# Patient Record
Sex: MALE | Race: WHITE | NOT HISPANIC OR LATINO | Employment: OTHER | URBAN - METROPOLITAN AREA
[De-identification: names, ages, dates, MRNs, and addresses within clinical notes are randomized per-mention and may not be internally consistent; named-entity substitution may affect disease eponyms.]

---

## 2019-08-20 ENCOUNTER — HOSPITAL ENCOUNTER (EMERGENCY)
Facility: HOSPITAL | Age: 74
Discharge: HOME | End: 2019-08-20
Attending: EMERGENCY MEDICINE
Payer: MEDICARE

## 2019-08-20 VITALS
TEMPERATURE: 98 F | BODY MASS INDEX: 26.77 KG/M2 | DIASTOLIC BLOOD PRESSURE: 80 MMHG | RESPIRATION RATE: 18 BRPM | HEART RATE: 70 BPM | OXYGEN SATURATION: 99 % | HEIGHT: 73 IN | SYSTOLIC BLOOD PRESSURE: 167 MMHG | WEIGHT: 202 LBS
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
## 2019-08-20 DIAGNOSIS — S80.812A ABRASION, LOWER LEG, ANTERIOR, LEFT, INITIAL ENCOUNTER: ICD-10-CM

## 2019-08-20 DIAGNOSIS — S80.12XA HEMATOMA OF LEG, LEFT, INITIAL ENCOUNTER: ICD-10-CM

## 2019-08-20 DIAGNOSIS — T14.8XXA CONTUSION OF BONE: ICD-10-CM
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
## 2019-08-20 PROCEDURE — 99283 EMERGENCY DEPT VISIT LOW MDM: CPT

## 2019-08-20 RX ORDER — MUPIROCIN 20 MG/G
OINTMENT TOPICAL 3 TIMES DAILY
Qty: 30 G | Refills: 0 | Status: SHIPPED | OUTPATIENT
Start: 2019-08-20

## 2019-08-20 RX ORDER — SULFAMETHOXAZOLE AND TRIMETHOPRIM 800; 160 MG/1; MG/1
1 TABLET ORAL 2 TIMES DAILY
Qty: 20 TABLET | Refills: 0 | Status: SHIPPED | OUTPATIENT
Start: 2019-08-20 | End: 2019-08-30

## 2019-08-21 NOTE — ED PROVIDER NOTES
Encounter Date: 8/20/2019       History     Chief Complaint   Patient presents with    Leg Pain     hematoma to left lower leg from zero turn lawn mower     74-year-old male, presents emergency department for evaluation of complaints of an injury to his left lower leg.  Approximately 1-2 weeks ago the patient states that he sustained blunt trauma to the left lower leg.  While teaching his significant other how to operate a 0 turned on more, the  made an abrupt movement which caused the machine to strike the patient in the anterior aspect of the left lower extremity.  The patient sustained a soft tissue injury, an abrasion and subsequent hematoma development in the lower leg.  Patient denies any real significant improvement or change in his wound or symptoms since onset and presents to the emergency department for evaluation.  He denies fever, chills, sweats, drainage from the area.  He denies difficulty ambulating, bony pain or tenderness.        Review of patient's allergies indicates:  No Known Allergies  History reviewed. No pertinent past medical history.  History reviewed. No pertinent surgical history.  History reviewed. No pertinent family history.  Social History     Tobacco Use    Smoking status: Not on file   Substance Use Topics    Alcohol use: Not on file    Drug use: Not on file     Review of Systems   Constitutional: Negative for fever.   HENT: Negative for sore throat.    Eyes: Negative for redness.   Respiratory: Negative for shortness of breath.    Cardiovascular: Negative for chest pain.   Gastrointestinal: Negative for nausea.   Genitourinary: Negative for dysuria.   Musculoskeletal: Negative for back pain.   Skin: Positive for wound. Negative for rash.   Neurological: Negative for weakness.   Hematological: Does not bruise/bleed easily.   Psychiatric/Behavioral: Negative for behavioral problems. The patient is not nervous/anxious.    All other systems reviewed and are  negative.      Physical Exam     Initial Vitals [08/20/19 1857]   BP Pulse Resp Temp SpO2   (!) 167/80 70 18 98.1 °F (36.7 °C) 99 %      MAP       --         Physical Exam    Constitutional: He appears well-developed and well-nourished.   HENT:   Head: Normocephalic and atraumatic.   Eyes: Conjunctivae, EOM and lids are normal.   Neck: Normal range of motion and full passive range of motion without pain.   Musculoskeletal: Normal range of motion.        Left lower leg: He exhibits tenderness and bony tenderness. He exhibits no swelling, no edema, no deformity and no laceration.        Legs:  Neurological: He is alert.   Skin: Skin is warm, dry and intact.   Psychiatric: He has a normal mood and affect. His speech is normal and behavior is normal.         ED Course   Procedures  Labs Reviewed - No data to display       Imaging Results    None          Medical Decision Making:   History:   I obtained history from: someone other than patient.  Initial Assessment:   NAD  Differential Diagnosis:   The patient's differential diagnoses includes but is not limited to musculoskeletal pain, contusion, abrasion, hematoma, abscess/cellulitis  Other:   I have discussed this case with another health care provider.       <> Summary of the Discussion: The patient's emergency department presentation, clinical course, pertinent findings of the physical exam as well as workup were discussed with the attending physician.  Plan of care was reviewed.              Attending Attestation:     Physician Attestation Statement for NP/PA:   I discussed this assessment and plan of this patient with the NP/PA, but I did not personally examine the patient. The face to face encounter was performed by the NP/PA.    Other NP/PA Attestation Additions:    History of Present Illness: I was not called upon to see this patient but was available for consultation and agree with the patient's disposition and management as it was presented to me by the APC.                         Clinical Impression:       ICD-10-CM ICD-9-CM   1. Contact with powered lawnmower as cause of accidental injury [place] farm as place of occurrence, initial encounter W28.XXXA E920.0    Y92.79 E849.1   2. Contusion of bone T14.8XXA 924.9   3. Abrasion, lower leg, anterior, left, initial encounter S80.812A 916.0   4. Hematoma of leg, left, initial encounter S80.12XA 924.5                                TAYLOR Tirado  08/20/19 1920       Morris Patterson MD  08/20/19 2116

## 2022-11-03 ENCOUNTER — HOSPITAL ENCOUNTER (EMERGENCY)
Facility: HOSPITAL | Age: 77
Discharge: HOME | End: 2022-11-03
Attending: EMERGENCY MEDICINE
Payer: MEDICARE

## 2022-11-03 VITALS
OXYGEN SATURATION: 93 % | RESPIRATION RATE: 17 BRPM | TEMPERATURE: 98 F | DIASTOLIC BLOOD PRESSURE: 86 MMHG | HEART RATE: 61 BPM | HEIGHT: 73 IN | SYSTOLIC BLOOD PRESSURE: 155 MMHG | WEIGHT: 200 LBS | BODY MASS INDEX: 26.51 KG/M2

## 2022-11-03 DIAGNOSIS — M43.6 TORTICOLLIS: ICD-10-CM

## 2022-11-03 DIAGNOSIS — M62.838 NECK MUSCLE SPASM: Primary | ICD-10-CM

## 2022-11-03 PROCEDURE — 63600175 PHARM REV CODE 636 W HCPCS: Performed by: EMERGENCY MEDICINE

## 2022-11-03 PROCEDURE — 25000003 PHARM REV CODE 250: Performed by: EMERGENCY MEDICINE

## 2022-11-03 PROCEDURE — 99284 EMERGENCY DEPT VISIT MOD MDM: CPT | Mod: 25

## 2022-11-03 PROCEDURE — 96372 THER/PROPH/DIAG INJ SC/IM: CPT | Performed by: EMERGENCY MEDICINE

## 2022-11-03 RX ORDER — NAPROXEN 500 MG/1
500 TABLET ORAL 2 TIMES DAILY WITH MEALS
Qty: 30 TABLET | Refills: 0 | Status: SHIPPED | OUTPATIENT
Start: 2022-11-03

## 2022-11-03 RX ORDER — METHOCARBAMOL 500 MG/1
1000 TABLET, FILM COATED ORAL
Status: COMPLETED | OUTPATIENT
Start: 2022-11-03 | End: 2022-11-03

## 2022-11-03 RX ORDER — DEXAMETHASONE SODIUM PHOSPHATE 4 MG/ML
8 INJECTION, SOLUTION INTRA-ARTICULAR; INTRALESIONAL; INTRAMUSCULAR; INTRAVENOUS; SOFT TISSUE
Status: COMPLETED | OUTPATIENT
Start: 2022-11-03 | End: 2022-11-03

## 2022-11-03 RX ORDER — OXYCODONE AND ACETAMINOPHEN 5; 325 MG/1; MG/1
2 TABLET ORAL
Status: COMPLETED | OUTPATIENT
Start: 2022-11-03 | End: 2022-11-03

## 2022-11-03 RX ORDER — METHOCARBAMOL 500 MG/1
1000 TABLET, FILM COATED ORAL 3 TIMES DAILY
Qty: 30 TABLET | Refills: 0 | Status: SHIPPED | OUTPATIENT
Start: 2022-11-03 | End: 2022-11-08

## 2022-11-03 RX ADMIN — DEXAMETHASONE SODIUM PHOSPHATE 8 MG: 4 INJECTION, SOLUTION INTRA-ARTICULAR; INTRALESIONAL; INTRAMUSCULAR; INTRAVENOUS; SOFT TISSUE at 10:11

## 2022-11-03 RX ADMIN — METHOCARBAMOL TABLETS 1000 MG: 500 TABLET, COATED ORAL at 10:11

## 2022-11-03 RX ADMIN — OXYCODONE AND ACETAMINOPHEN 2 TABLET: 325; 5 TABLET ORAL at 07:11

## 2022-11-03 NOTE — ED PROVIDER NOTES
Encounter Date: 11/3/2022       History     Chief Complaint   Patient presents with    Neck Pain     X3 days     77-year-old male presented emergency department with 3 days of neck pain.  Patient has bilateral neck pain in the paraspinal area which is worse when he tries to look to the sites.  Denies any trauma.  Patient said he woke up with this pain 3 days ago and still persistent.  Denies fever chills or nausea vomiting.  Patient said he has a right hand trigger finger surgery scheduled for tomorrow.  Patient said he wanted to get some relief for the neck pain.  Denies any weakness or numbness or fever or chills and denies any trauma    Review of patient's allergies indicates:  No Known Allergies  No past medical history on file.  No past surgical history on file.  No family history on file.     Review of Systems   Constitutional: Negative.    HENT: Negative.     Eyes: Negative.    Respiratory: Negative.     Cardiovascular: Negative.    Gastrointestinal: Negative.    Endocrine: Negative.    Genitourinary: Negative.    Musculoskeletal:  Positive for neck pain.   Skin: Negative.    Allergic/Immunologic: Negative.    Neurological: Negative.    Hematological: Negative.    Psychiatric/Behavioral: Negative.     All other systems reviewed and are negative.    Physical Exam     Initial Vitals [11/03/22 0655]   BP Pulse Resp Temp SpO2   (!) 147/83 61 18 98.6 °F (37 °C) (!) 94 %      MAP       --         Physical Exam    Nursing note and vitals reviewed.  Constitutional: He appears well-developed and well-nourished.   HENT:   Head: Normocephalic and atraumatic.   Nose: Nose normal.   Eyes: Conjunctivae and EOM are normal.   Neck: No tracheal deviation present.   Cardiovascular:  Normal rate, regular rhythm, normal heart sounds and intact distal pulses.     Exam reveals no friction rub.       No murmur heard.  Pulmonary/Chest: Breath sounds normal. No respiratory distress. He has no wheezes. He has no rales.   Abdominal:  Abdomen is soft. He exhibits no distension. There is no abdominal tenderness.   Musculoskeletal:         General: Tenderness present. Normal range of motion.      Comments: Cervical paraspinal tenderness more on the left than the right.  No midline C-spine tenderness     Lymphadenopathy:     He has no cervical adenopathy.   Neurological: He is alert and oriented to person, place, and time. He has normal strength.   Skin: Skin is warm and dry. Capillary refill takes less than 2 seconds.   Psychiatric: He has a normal mood and affect. Thought content normal.       ED Course   Procedures  Labs Reviewed - No data to display       Imaging Results              CT Cervical Spine Without Contrast (Final result)  Result time 11/03/22 08:51:24      Final result by Jose Angel Moon MD (11/03/22 08:51:24)                   Narrative:    CMS MANDATED QUALITY DATA - CT RADIATION  436    All CT scans at this facility utilize dose modulation, iterative reconstruction, and/or weight based dosing when appropriate to reduce radiation dose to as low as reasonably achievable.    CT CERVICAL SPINE WITHOUT IV CONTRAST    CLINICAL HISTORY:  77 years Male Abnormal posture; Neck pain, acute, no red flags    COMPARISON: None    FINDINGS: Craniocervical junction is intact. Atlantodental degenerative change.    Cervical spine alignment is within normal limits. Patient is status post ACDF extending from C4 through C7. Interbody bone grafts at C3-4, C4-5, and C5-6 with partial bony continuity across the C6-7 vertebral bodies and mature continuity across C4-5 and C5-6. Anterior plate and screw fixation hardware is intact, without evidence of complication. No acute fracture involving the cervical spine.    Evaluation of cervical soft tissues shows no acute pathology.    IMPRESSION:    No acute osseous abnormality involving the cervical spine.    Status post ACDF C4-C7.    Electronically signed by:  Jose Angel Moon MD  11/3/2022 8:51 AM CDT  Workstation: [id number]                                     Medications   dexAMETHasone injection 8 mg (has no administration in time range)   methocarbamoL tablet 1,000 mg (has no administration in time range)   oxyCODONE-acetaminophen 5-325 mg per tablet 2 tablet (2 tablets Oral Given 11/3/22 7732)     Medical Decision Making:   Differential Diagnosis:   77-year-old male presented emergency department with neck pain.  Patient had previous neck surgery.  Screening CT did not show any acute findings.  Pain improved with treatment.  Patient had previous neck surgery and acute changes noted.  Screening labs reviewed and discharged with instructions and follow-up and return precautions given.  Clinical Tests:   Radiological Study: Reviewed                        Clinical Impression:   Final diagnoses:  [M62.838] Neck muscle spasm (Primary)  [M43.6] Torticollis      ED Disposition Condition    Discharge Stable          ED Prescriptions       Medication Sig Dispense Start Date End Date Auth. Provider    methocarbamoL (ROBAXIN) 500 MG Tab Take 2 tablets (1,000 mg total) by mouth 3 (three) times daily. for 5 days 30 tablet 11/3/2022 11/8/2022 José Fox MD    naproxen (NAPROSYN) 500 MG tablet Take 1 tablet (500 mg total) by mouth 2 (two) times daily with meals. 30 tablet 11/3/2022 -- José Fox MD          Follow-up Information       Follow up With Specialties Details Why Contact Info    Jerry Bentley MD Family Medicine In 2 days  [address] [place] [address] [id number]  [place] [address]  [phone]               José Fox MD  11/03/22 0675

## 2023-12-05 ENCOUNTER — HOSPITAL ENCOUNTER (EMERGENCY)
Facility: HOSPITAL | Age: 78
Discharge: HOME | End: 2023-12-05
Attending: EMERGENCY MEDICINE
Payer: COMMERCIAL

## 2023-12-05 VITALS
SYSTOLIC BLOOD PRESSURE: 139 MMHG | DIASTOLIC BLOOD PRESSURE: 79 MMHG | OXYGEN SATURATION: 95 % | WEIGHT: 200 LBS | HEIGHT: 73 IN | RESPIRATION RATE: 17 BRPM | TEMPERATURE: 98 F | BODY MASS INDEX: 26.51 KG/M2 | HEART RATE: 58 BPM

## 2023-12-05 DIAGNOSIS — Z98.1 S/P CERVICAL SPINAL FUSION: ICD-10-CM

## 2023-12-05 DIAGNOSIS — M62.838 MUSCLE SPASMS OF NECK: ICD-10-CM

## 2023-12-05 DIAGNOSIS — S16.1XXA STRAIN OF NECK MUSCLE, INITIAL ENCOUNTER: Primary | ICD-10-CM

## 2023-12-05 PROCEDURE — 99285 EMERGENCY DEPT VISIT HI MDM: CPT | Mod: 25

## 2023-12-05 PROCEDURE — 96372 THER/PROPH/DIAG INJ SC/IM: CPT | Performed by: EMERGENCY MEDICINE

## 2023-12-05 PROCEDURE — 63600175 PHARM REV CODE 636 W HCPCS: Performed by: EMERGENCY MEDICINE

## 2023-12-05 PROCEDURE — 25000003 PHARM REV CODE 250: Performed by: EMERGENCY MEDICINE

## 2023-12-05 RX ORDER — DEXAMETHASONE SODIUM PHOSPHATE 4 MG/ML
8 INJECTION, SOLUTION INTRA-ARTICULAR; INTRALESIONAL; INTRAMUSCULAR; INTRAVENOUS; SOFT TISSUE
Status: COMPLETED | OUTPATIENT
Start: 2023-12-05 | End: 2023-12-05

## 2023-12-05 RX ORDER — METHOCARBAMOL 500 MG/1
1000 TABLET, FILM COATED ORAL 3 TIMES DAILY
Qty: 30 TABLET | Refills: 0 | Status: SHIPPED | OUTPATIENT
Start: 2023-12-05 | End: 2023-12-10

## 2023-12-05 RX ORDER — NALBUPHINE HYDROCHLORIDE 10 MG/ML
10 INJECTION, SOLUTION INTRAMUSCULAR; INTRAVENOUS; SUBCUTANEOUS
Status: DISCONTINUED | OUTPATIENT
Start: 2023-12-05 | End: 2023-12-05 | Stop reason: HOSPADM

## 2023-12-05 RX ORDER — METHOCARBAMOL 500 MG/1
1000 TABLET, FILM COATED ORAL
Status: COMPLETED | OUTPATIENT
Start: 2023-12-05 | End: 2023-12-05

## 2023-12-05 RX ORDER — HYDROCODONE BITARTRATE AND ACETAMINOPHEN 7.5; 325 MG/1; MG/1
1 TABLET ORAL EVERY 6 HOURS PRN
Qty: 12 TABLET | Refills: 0 | Status: SHIPPED | OUTPATIENT
Start: 2023-12-05

## 2023-12-05 RX ORDER — METHYLPREDNISOLONE 4 MG/1
TABLET ORAL
Qty: 21 EACH | Refills: 0 | Status: SHIPPED | OUTPATIENT
Start: 2023-12-05 | End: 2023-12-26

## 2023-12-05 RX ADMIN — NALBUPHINE HYDROCHLORIDE 10 MG: 10 INJECTION, SOLUTION INTRAMUSCULAR; INTRAVENOUS; SUBCUTANEOUS at 08:12

## 2023-12-05 RX ADMIN — DEXAMETHASONE SODIUM PHOSPHATE 8 MG: 4 INJECTION INTRA-ARTICULAR; INTRALESIONAL; INTRAMUSCULAR; INTRAVENOUS; SOFT TISSUE at 07:12

## 2023-12-05 RX ADMIN — METHOCARBAMOL TABLETS 1000 MG: 500 TABLET, COATED ORAL at 07:12

## 2023-12-05 NOTE — ED PROVIDER NOTES
Encounter Date: 12/5/2023       History     Chief Complaint   Patient presents with    Neck Pain     X 6 days, denies injury     78-year-old male who has a benign past medical history, presents emergency room with complaints of having cervical pain has progressively worsened over 1 week.  No fever or chills.  No trauma.  He denies any radicular pain into his arms had no numbness to his hand or fingers.  Patient states that the pain does radiate into his left trapezius area.  He admits he is had significant restriction in range of motion.  He states that even is uncomfortable with swallowing.  No earache or hoarseness.      Review of patient's allergies indicates:  No Known Allergies  No past medical history on file.  No past surgical history on file.  No family history on file.     Review of Systems   Constitutional:  Negative for chills, diaphoresis and fever.   HENT:  Positive for trouble swallowing. Negative for ear discharge, ear pain and voice change.    Respiratory:  Negative for cough and shortness of breath.    Cardiovascular:  Negative for chest pain.   Gastrointestinal:  Negative for abdominal pain, nausea and vomiting.   Genitourinary:  Negative for difficulty urinating.   Skin: Negative.    Neurological:  Negative for weakness and numbness.   All other systems reviewed and are negative.      Physical Exam     Initial Vitals [12/05/23 0654]   BP Pulse Resp Temp SpO2   (!) 166/96 68 16 98.1 °F (36.7 °C) 97 %      MAP       --         Physical Exam    Vitals reviewed.  Constitutional: He appears well-developed and well-nourished. He is not diaphoretic. No distress.   Mildly uncomfortable   HENT:   Head: Normocephalic and atraumatic.   Right Ear: External ear normal.   Left Ear: External ear normal.   Nose: Nose normal.   Mouth/Throat: Oropharynx is clear and moist.   Eyes: Conjunctivae and EOM are normal. Pupils are equal, round, and reactive to light.   Neck: Neck supple. No JVD present.   Normal range of  motion.  Cardiovascular:  Normal rate, regular rhythm, normal heart sounds and intact distal pulses.     Exam reveals no gallop and no friction rub.       No murmur heard.  Pulmonary/Chest: Breath sounds normal. No respiratory distress. He has no wheezes. He has no rhonchi. He has no rales. He exhibits no tenderness.   Abdominal: Abdomen is soft. Bowel sounds are normal. He exhibits no distension. There is no abdominal tenderness. There is no rebound and no guarding.   Musculoskeletal:         General: No tenderness or edema. Normal range of motion.      Cervical back: Normal range of motion and neck supple.     Lymphadenopathy:     He has no cervical adenopathy.   Neurological: He is alert and oriented to person, place, and time. GCS score is 15. GCS eye subscore is 4. GCS verbal subscore is 5. GCS motor subscore is 6.   Skin: Skin is warm and dry. Capillary refill takes less than 2 seconds. No rash noted. No erythema. No pallor.   Psychiatric: He has a normal mood and affect. His behavior is normal. Judgment and thought content normal.         ED Course   Procedures  Labs Reviewed - No data to display       Imaging Results              CT Cervical Spine Without Contrast (Final result)  Result time 12/05/23 08:09:22      Final result by Yessenia Fermin MD (12/05/23 08:09:22)                   Narrative:    All CT scans at this facility used dose modulation, iterative reconstruction and/or weight-based dosing when appropriate to reduce radiation doses  as low as reasonably achievable.    CT scan of the cervical spine    Clinical history is neck pain history of prior cervical fusion    Axial images the cervical spine were obtained with sagittal and coronal reconstructed images. The cervical spine is in satisfactory alignment. There is instrumented anterior cervical fusion from C4 through C7 with cortical plate and fixating screws. There are interbody disc expander is in bony bridging at C4-5, C5-6 and C6-7. The  hardware is intact.  There is mild disc space narrowing at C7-T1.    There are no fractures or subluxation. The facet joints are aligned. The odontoid process is intact and the craniocervical junction is normal.    At C2-3 and C3-4 there is right facet hypertrophy without central canal stenosis or foraminal narrowing  At C4-5 C5-6 and C6-7 there is no significant abnormality    At C7-T1 there is no significant abnormality.    The paraspinous soft tissues are normal. The lung apices are clear.    IMPRESSION: No acute osseous abnormality    Prior instrumented anterior cervical fusion from C4 through C7 without complication    Electronically signed by:  Yessenia Fermin MD  12/05/2023 08:09 AM [facility] Workstation: [id number]                                     Medications   nalbuphine injection 10 mg (10 mg Intramuscular Given 12/5/23 0824)   methocarbamoL tablet 1,000 mg (1,000 mg Oral Given 12/5/23 0730)   dexAMETHasone injection 8 mg (8 mg Intramuscular Given 12/5/23 0730)     Medical Decision Making  Amount and/or Complexity of Data Reviewed  Radiology: ordered.    Risk  Prescription drug management.              Attending Attestation:             Attending ED Notes:   ED course and MDM:  This 78-year-old male who is s/p anterior cervical fusion involving C 4 through 7 done at [facility], presented with complaints of progressive neck pain over the last week unrelated to any trauma.  He is not on any medication for this at this time.  He is no radiculopathy.  Physical exam revealed no midline bony tenderness but he does have significant paravertebral muscle spasm and tenderness with significant diminished range of motion.  During ED course the patient was given IM Decadron and Nubain and oral Robaxin.  CT scan of the cervical spine did not show any acute bony abnormalities.  Was no evidence of fracture, spinal stenosis.  The patient will be referred follow-up to Dr. Aldridge orthopedics or Dr. Apple  neurosurgery.  He will be discharged with further analgesics, muscle relaxers and steroids.                             Clinical Impression:  Final diagnoses:  [S16.1XXA] Strain of neck muscle, initial encounter (Primary)  [M62.838] Muscle spasms of neck  [Z98.1] S/P cervical spinal fusion          ED Disposition Condition    Discharge Stable          ED Prescriptions       Medication Sig Dispense Start Date End Date Auth. Provider    methocarbamoL (ROBAXIN) 500 MG Tab Take 2 tablets (1,000 mg total) by mouth 3 (three) times daily. for 5 days 30 tablet 12/5/2023 12/10/2023 Brian Day Jr., MD    methylPREDNISolone (MEDROL DOSEPACK) 4 mg tablet use as directed 21 each 12/5/2023 12/26/2023 Brian Day Jr., MD    HYDROcodone-acetaminophen (NORCO) 7.5-325 mg per tablet Take 1 tablet by mouth every 6 (six) hours as needed for Pain. 12 tablet 12/5/2023 -- Brian Day Jr., MD          Follow-up Information       Follow up With Specialties Details Why Contact Info    Jax Aldridge MD Orthopedic Surgery, Surgery, Sports Medicine   [address] [facility]  [address]  [place] [address]  [phone]      Kit Denson, DO Neurosurgery Schedule an appointment as soon as possible for a visit   [address] [place] [address]   [address]  [place] [address]  [phone]               Brian Day Jr., MD  12/05/23 0916       Brian Day Jr., MD  01/11/24 2283

## 2023-12-05 NOTE — DISCHARGE INSTRUCTIONS
Follow up with Dr. Aldridge orthopedist or Dr. Apple neurosurgeon.  Take medications as directed.  Return if needed.

## 2025-01-30 ENCOUNTER — TELEPHONE (OUTPATIENT)
Dept: FAMILY MEDICINE | Facility: CLINIC | Age: 80
End: 2025-01-30
Payer: COMMERCIAL

## 2025-01-30 NOTE — TELEPHONE ENCOUNTER
Clemente Vazquez [facility] Staff     ----- Message from Clemente sent at 1/30/2025 10:40 AM CST -----  Contact: Self  Type:  Sooner Appointment Request    Caller is requesting a sooner appointment.  Caller declined first available appointment listed below.  Caller will not accept being placed on the waitlist and is requesting a message be sent to doctor.    Name of Caller:  Patient  When is the first available appointment?  NA  Symptoms:  [facility] - Previous Provider retired  Would the patient rather a call back or a response via [facility]? Call Back  Best Call Back Number:  [phone]  Additional Information:  Patient is requesting a call back to schedule a sooner appt, he is asking to please leave a message if he does not answer.

## 2025-01-30 NOTE — TELEPHONE ENCOUNTER
Patient returned call to office. Call transferred directly to writer per patient access representative Lorena Leal.  Patient states his PCP in [place] is retiring, requesting appointment with Dr. Barajas to establish care.  First available new patient appointment noted in October.  Offered appointment with TAYLOR Raymond on 2/11/25.  Patient states he prefers to establish care with a MD not BERNARDO.  Offered appointment with Dr. Mason in April, patient states he will be in [place] the month of April.  Offered appointment in May, patient declined, states will continue search for PCP.  Encouraged patient to call back if he changes his mind and would like to schedule one of the above mentioned new patient appointments.

## 2025-08-27 ENCOUNTER — OFFICE VISIT (OUTPATIENT)
Dept: URGENT CARE | Facility: CLINIC | Age: 80
End: 2025-08-27
Payer: COMMERCIAL

## 2025-08-27 VITALS
HEART RATE: 71 BPM | SYSTOLIC BLOOD PRESSURE: 126 MMHG | WEIGHT: 200 LBS | TEMPERATURE: 98 F | RESPIRATION RATE: 16 BRPM | HEIGHT: 73 IN | OXYGEN SATURATION: 96 % | BODY MASS INDEX: 26.51 KG/M2 | DIASTOLIC BLOOD PRESSURE: 79 MMHG

## 2025-08-27 DIAGNOSIS — J01.00 ACUTE NON-RECURRENT MAXILLARY SINUSITIS: Primary | ICD-10-CM

## 2025-08-27 RX ORDER — AMOXICILLIN AND CLAVULANATE POTASSIUM 875; 125 MG/1; MG/1
1 TABLET, FILM COATED ORAL EVERY 12 HOURS
Qty: 20 TABLET | Refills: 0 | Status: SHIPPED | OUTPATIENT
Start: 2025-08-27 | End: 2025-09-06

## 2025-08-27 RX ORDER — DEXAMETHASONE SODIUM PHOSPHATE 4 MG/ML
4 INJECTION, SOLUTION INTRA-ARTICULAR; INTRALESIONAL; INTRAMUSCULAR; INTRAVENOUS; SOFT TISSUE
Status: COMPLETED | OUTPATIENT
Start: 2025-08-27 | End: 2025-08-27

## 2025-08-27 RX ADMIN — DEXAMETHASONE SODIUM PHOSPHATE 4 MG: 4 INJECTION, SOLUTION INTRA-ARTICULAR; INTRALESIONAL; INTRAMUSCULAR; INTRAVENOUS; SOFT TISSUE at 03:08
